# Patient Record
Sex: MALE | Race: WHITE | NOT HISPANIC OR LATINO | Employment: OTHER | URBAN - METROPOLITAN AREA
[De-identification: names, ages, dates, MRNs, and addresses within clinical notes are randomized per-mention and may not be internally consistent; named-entity substitution may affect disease eponyms.]

---

## 2018-05-03 ENCOUNTER — OFFICE VISIT (OUTPATIENT)
Dept: VASCULAR SURGERY | Facility: CLINIC | Age: 78
End: 2018-05-03
Payer: MEDICARE

## 2018-05-03 VITALS
HEART RATE: 72 BPM | TEMPERATURE: 97.9 F | DIASTOLIC BLOOD PRESSURE: 60 MMHG | SYSTOLIC BLOOD PRESSURE: 128 MMHG | RESPIRATION RATE: 18 BRPM

## 2018-05-03 DIAGNOSIS — I72.4 PSEUDOANEURYSM OF LEFT FEMORAL ARTERY (HCC): Primary | ICD-10-CM

## 2018-05-03 PROCEDURE — 99212 OFFICE O/P EST SF 10 MIN: CPT | Performed by: SURGERY

## 2018-05-03 RX ORDER — OSELTAMIVIR PHOSPHATE 75 MG/1
CAPSULE ORAL
COMMUNITY
End: 2018-05-03 | Stop reason: ALTCHOICE

## 2018-05-03 RX ORDER — LEVOFLOXACIN 500 MG/1
TABLET, FILM COATED ORAL
COMMUNITY
End: 2018-05-03 | Stop reason: ALTCHOICE

## 2018-05-03 RX ORDER — ACETAMINOPHEN 325 MG/1
650 TABLET ORAL EVERY 6 HOURS PRN
COMMUNITY

## 2018-05-03 RX ORDER — LEVOFLOXACIN 250 MG/1
TABLET ORAL
COMMUNITY
End: 2018-05-03 | Stop reason: ALTCHOICE

## 2018-05-03 RX ORDER — PRASUGREL 10 MG/1
10 TABLET, FILM COATED ORAL
COMMUNITY

## 2018-05-03 RX ORDER — OMEPRAZOLE 20 MG/1
CAPSULE, DELAYED RELEASE ORAL
COMMUNITY
End: 2018-05-03 | Stop reason: ALTCHOICE

## 2018-05-03 RX ORDER — TRAMADOL HYDROCHLORIDE 50 MG/1
TABLET ORAL
COMMUNITY
End: 2018-05-03 | Stop reason: ALTCHOICE

## 2018-05-03 RX ORDER — SULFAMETHOXAZOLE AND TRIMETHOPRIM 800; 160 MG/1; MG/1
TABLET ORAL
COMMUNITY
End: 2018-05-03 | Stop reason: ALTCHOICE

## 2018-05-03 RX ORDER — FINASTERIDE 5 MG/1
TABLET, FILM COATED ORAL
COMMUNITY

## 2018-05-03 RX ORDER — TAMSULOSIN HYDROCHLORIDE 0.4 MG/1
CAPSULE ORAL
COMMUNITY
End: 2018-05-03 | Stop reason: SDUPTHER

## 2018-05-03 RX ORDER — OMEPRAZOLE 20 MG/1
CAPSULE, DELAYED RELEASE ORAL
COMMUNITY

## 2018-05-03 RX ORDER — DIAPER,BRIEF,INFANT-TODD,DISP
EACH MISCELLANEOUS
COMMUNITY
End: 2018-05-03 | Stop reason: ALTCHOICE

## 2018-05-03 RX ORDER — ATORVASTATIN CALCIUM 40 MG/1
40 TABLET, FILM COATED ORAL DAILY
COMMUNITY

## 2018-05-03 RX ORDER — FINASTERIDE 5 MG/1
TABLET, FILM COATED ORAL
COMMUNITY
End: 2018-05-03 | Stop reason: ALTCHOICE

## 2018-05-03 RX ORDER — METHYLPREDNISOLONE 4 MG/1
TABLET ORAL
COMMUNITY
End: 2018-05-03 | Stop reason: ALTCHOICE

## 2018-05-03 RX ORDER — FLUTICASONE PROPIONATE 50 MCG
1 SPRAY, SUSPENSION (ML) NASAL DAILY
COMMUNITY

## 2018-05-03 RX ORDER — FERROUS SULFATE 325(65) MG
325 TABLET ORAL
COMMUNITY

## 2018-05-03 RX ORDER — IPRATROPIUM BROMIDE AND ALBUTEROL SULFATE 2.5; .5 MG/3ML; MG/3ML
SOLUTION RESPIRATORY (INHALATION)
COMMUNITY
End: 2018-05-03 | Stop reason: ALTCHOICE

## 2018-05-03 RX ORDER — TAMSULOSIN HYDROCHLORIDE 0.4 MG/1
CAPSULE ORAL
COMMUNITY

## 2018-05-03 RX ORDER — PRAVASTATIN SODIUM 40 MG
TABLET ORAL
COMMUNITY
End: 2018-05-03 | Stop reason: ALTCHOICE

## 2018-05-03 NOTE — PROGRESS NOTES
Assessment/Plan:    Pseudoaneurysm of left femoral artery (HCC)   Left groin pseudoaneurysm status post cardiac cath done and [facility]   He underwent injection of the pseudoaneurysm via thrombin  There was recurrence and hence we took him back the next day under sedation and performed injection of the multi lobulated pseudoaneurysm  Subsequent duplex was negative  He is here for office visit  On clinical exam there appears to be no recurrence of the left groin pseudoaneurysm  The left foot is warm and well perfused  No further vascular intervention is necessary  Follow-up as needed  Diagnoses and all orders for this visit:    Pseudoaneurysm of left femoral artery (HCC)              Subjective:      Patient ID: An George is a 69 y o  male  Patient is seen for f/u PSA L groin 3/29 by Dr Dawson Senior  He is s/p cardiac cath  Patient is a poor historian and is alone  He denies L leg pain  The following portions of the patient's history were reviewed and updated as appropriate: allergies, current medications, past family history, past medical history, past social history, past surgical history and problem list     Review of Systems   HENT: Negative  Eyes: Negative  Respiratory: Negative  Cardiovascular: Negative  Gastrointestinal: Negative  Endocrine: Negative  Genitourinary: Negative  Musculoskeletal: Positive for gait problem  Skin: Negative  Allergic/Immunologic: Negative  Neurological: Positive for weakness  Hematological: Negative  Psychiatric/Behavioral: Negative  Objective:      /60 (BP Location: Left arm, Patient Position: Sitting, Cuff Size: Adult)   Pulse 72   Temp 97 9 °F (36 6 °C) (Tympanic)   Resp 18          Physical Exam   Constitutional: He is oriented to person, place, and time  He appears well-developed and well-nourished  HENT:   Head: Normocephalic and atraumatic     Cardiovascular: Normal rate, regular rhythm and normal heart sounds  No murmur heard  Distal signals in the left anterior and posterior tibial   Left foot warm and well perfused  Left groin has a area of induration at the cardiac catheterization puncture site but this is not pulsatile  Abdominal: Soft  Musculoskeletal: He exhibits no edema  Bilateral hand contractures  Wheelchair bound  Neurological: He is alert and oriented to person, place, and time  Skin: Skin is warm and dry  He is not diaphoretic  Psychiatric: He has a normal mood and affect  His behavior is normal    Nursing note and vitals reviewed

## 2018-05-03 NOTE — ASSESSMENT & PLAN NOTE
Left groin pseudoaneurysm status post cardiac cath done and [facility]   He underwent injection of the pseudoaneurysm via thrombin  There was recurrence and hence we took him back the next day under sedation and performed injection of the multi lobulated pseudoaneurysm  Subsequent duplex was negative  He is here for office visit  On clinical exam there appears to be no recurrence of the left groin pseudoaneurysm  The left foot is warm and well perfused  No further vascular intervention is necessary  Follow-up as needed

## 2018-05-03 NOTE — LETTER
May 3, 2018     Mamta Phillips MD  [facility] [id number]    Patient: Judy Gurrola   YOB: 1940   Date of Visit: 5/3/2018       Dear Dr Lucy Lorenzana: Thank you for referring Marj Tyler to me for evaluation  Below are my notes for this consultation  If you have questions, please do not hesitate to call me  I look forward to following your patient along with you  Sincerely,        Brittany Schumacher MD        CC: MD Brittany Tucker MD  5/3/2018  2:55 PM  Sign at close encounter  Assessment/Plan:    Pseudoaneurysm of left femoral artery [facility])   Left groin pseudoaneurysm status post cardiac cath done and [facility]   He underwent injection of the pseudoaneurysm via thrombin  There was recurrence and hence we took him back the next day under sedation and performed injection of the multi lobulated pseudoaneurysm  Subsequent duplex was negative  He is here for office visit  On clinical exam there appears to be no recurrence of the left groin pseudoaneurysm  The left foot is warm and well perfused  No further vascular intervention is necessary  Follow-up as needed  Diagnoses and all orders for this visit:    Pseudoaneurysm of left femoral artery (HCC)              Subjective:      Patient ID: Judy Gurrola is a 69 y o  male  Patient is seen for f/u PSA L groin 3/29 by Dr Lauro Easley  He is s/p cardiac cath  Patient is a poor historian and is alone  He denies L leg pain  The following portions of the patient's history were reviewed and updated as appropriate: allergies, current medications, past family history, past medical history, past social history, past surgical history and problem list     Review of Systems   HENT: Negative  Eyes: Negative  Respiratory: Negative  Cardiovascular: Negative  Gastrointestinal: Negative  Endocrine: Negative  Genitourinary: Negative      Musculoskeletal: Positive for gait problem  Skin: Negative  Allergic/Immunologic: Negative  Neurological: Positive for weakness  Hematological: Negative  Psychiatric/Behavioral: Negative  Objective:      /60 (BP Location: Left arm, Patient Position: Sitting, Cuff Size: Adult)   Pulse 72   Temp 97 9 °F (36 6 °C) (Tympanic)   Resp 18          Physical Exam   Constitutional: He is oriented to person, place, and time  He appears well-developed and well-nourished  HENT:   Head: Normocephalic and atraumatic  Cardiovascular: Normal rate, regular rhythm and normal heart sounds  No murmur heard  Distal signals in the left anterior and posterior tibial   Left foot warm and well perfused  Left groin has a area of induration at the cardiac catheterization puncture site but this is not pulsatile  Abdominal: Soft  Musculoskeletal: He exhibits no edema  Bilateral hand contractures  Wheelchair bound  Neurological: He is alert and oriented to person, place, and time  Skin: Skin is warm and dry  He is not diaphoretic  Psychiatric: He has a normal mood and affect  His behavior is normal    Nursing note and vitals reviewed